# Patient Record
Sex: MALE | Race: OTHER | Employment: FULL TIME | URBAN - METROPOLITAN AREA
[De-identification: names, ages, dates, MRNs, and addresses within clinical notes are randomized per-mention and may not be internally consistent; named-entity substitution may affect disease eponyms.]

---

## 2021-09-12 ENCOUNTER — APPOINTMENT (OUTPATIENT)
Dept: GENERAL RADIOLOGY | Facility: HOSPITAL | Age: 26
End: 2021-09-12
Attending: EMERGENCY MEDICINE
Payer: COMMERCIAL

## 2021-09-12 ENCOUNTER — HOSPITAL ENCOUNTER (EMERGENCY)
Facility: HOSPITAL | Age: 26
Discharge: HOME | End: 2021-09-12
Attending: EMERGENCY MEDICINE
Payer: COMMERCIAL

## 2021-09-12 VITALS
HEIGHT: 68 IN | WEIGHT: 155 LBS | HEART RATE: 72 BPM | DIASTOLIC BLOOD PRESSURE: 83 MMHG | RESPIRATION RATE: 18 BRPM | SYSTOLIC BLOOD PRESSURE: 122 MMHG | OXYGEN SATURATION: 98 % | TEMPERATURE: 98 F | BODY MASS INDEX: 23.49 KG/M2

## 2021-09-12 DIAGNOSIS — S93.401A SPRAIN OF RIGHT ANKLE, UNSPECIFIED LIGAMENT, INITIAL ENCOUNTER: Primary | ICD-10-CM

## 2021-09-12 PROCEDURE — 99283 EMERGENCY DEPT VISIT LOW MDM: CPT

## 2021-09-12 PROCEDURE — 73610 X-RAY EXAM OF ANKLE: CPT | Performed by: EMERGENCY MEDICINE

## 2021-09-12 NOTE — ED QUICK NOTES
Patient given discharge instructions and work note. Patient verbalized understanding. Patient ambulated out of ED with steady gait on crutches.

## 2021-09-12 NOTE — ED PROVIDER NOTES
Patient Seen in: [facility] [place] AND [facility] Emergency Department    History   Patient presents with:  Leg or Foot Injury    Stated Complaint: R ankle injury    HPI    Patient presents to the emergency department with injury to the right ankle after twisting it pl Warm, dry, well perfused. Good skin turgor. No rashes seen. Neurology:  Moving all extremities equally with good coordination. No cranial nerve asymmetry noted. Psychiatric:  Normal affect. Oriented. No unusual behavior. Interacting well.     Patien

## 2021-09-12 NOTE — ED INITIAL ASSESSMENT (HPI)
The patient reports left ankle pain after twisting his ankle while playing basketball yesterday morning. There is notable swelling to the right ankle.

## 2021-12-11 ENCOUNTER — HOSPITAL ENCOUNTER (EMERGENCY)
Facility: HOSPITAL | Age: 26
Discharge: HOME | End: 2021-12-12
Payer: COMMERCIAL

## 2021-12-11 ENCOUNTER — APPOINTMENT (OUTPATIENT)
Dept: GENERAL RADIOLOGY | Facility: HOSPITAL | Age: 26
End: 2021-12-11
Attending: NURSE PRACTITIONER
Payer: COMMERCIAL

## 2021-12-11 DIAGNOSIS — U07.1 PNEUMONIA DUE TO COVID-19 VIRUS: Primary | ICD-10-CM

## 2021-12-11 DIAGNOSIS — J12.82 PNEUMONIA DUE TO COVID-19 VIRUS: Primary | ICD-10-CM

## 2021-12-11 PROCEDURE — 99284 EMERGENCY DEPT VISIT MOD MDM: CPT

## 2021-12-11 PROCEDURE — 84484 ASSAY OF TROPONIN QUANT: CPT | Performed by: NURSE PRACTITIONER

## 2021-12-11 PROCEDURE — 71045 X-RAY EXAM CHEST 1 VIEW: CPT | Performed by: NURSE PRACTITIONER

## 2021-12-11 PROCEDURE — 93005 ELECTROCARDIOGRAM TRACING: CPT

## 2021-12-11 PROCEDURE — 85025 COMPLETE CBC W/AUTO DIFF WBC: CPT | Performed by: NURSE PRACTITIONER

## 2021-12-11 PROCEDURE — 93010 ELECTROCARDIOGRAM REPORT: CPT | Performed by: NURSE PRACTITIONER

## 2021-12-11 PROCEDURE — 36415 COLL VENOUS BLD VENIPUNCTURE: CPT

## 2021-12-11 PROCEDURE — 80048 BASIC METABOLIC PNL TOTAL CA: CPT | Performed by: NURSE PRACTITIONER

## 2021-12-12 VITALS
WEIGHT: 155 LBS | RESPIRATION RATE: 18 BRPM | HEIGHT: 67 IN | BODY MASS INDEX: 24.33 KG/M2 | HEART RATE: 90 BPM | OXYGEN SATURATION: 94 % | SYSTOLIC BLOOD PRESSURE: 115 MMHG | DIASTOLIC BLOOD PRESSURE: 73 MMHG | TEMPERATURE: 98 F

## 2021-12-12 RX ORDER — ALBUTEROL SULFATE 90 UG/1
2 AEROSOL, METERED RESPIRATORY (INHALATION) EVERY 4 HOURS PRN
Qty: 1 EACH | Refills: 0 | Status: SHIPPED | OUTPATIENT
Start: 2021-12-12 | End: 2022-01-11

## 2021-12-12 NOTE — ED INITIAL ASSESSMENT (HPI)
Patient here with cough for the past week. Thursday patient began vomiting, having shortness of breath and body aches.

## 2021-12-12 NOTE — ED PROVIDER NOTES
Patient Seen in: [facility] AND [facility] Emergency Department      History   Patient presents with:  Cough/URI    Stated Complaint: cough, vomiting    Subjective:   25yo/m no chronic medical problems reports to the ED with cough, dyspnea for 1 week.  No vomitin Cardiovascular:      Rate and Rhythm: Normal rate and regular rhythm. Heart sounds: Normal heart sounds. Pulmonary:      Effort: Pulmonary effort is normal.      Breath sounds: Normal breath sounds.    Abdominal:      General: Bowel sounds are norm patchy opacities in the mid and lower lungs, suspicious for multifocal pneumonia/viral pneumonia/COVID-19 infection. Alternative consideration is diffuse aspiration. No pneumothorax. Normal cardiac silhouette. No free air under the diaphragm.

## (undated) NOTE — LETTER
Date & Time: 9/12/2021, 2:06 PM  Patient: Aryan Goss  Encounter Provider(s):    Karma Pardo MD       To Whom It May Concern:    Aryan Goss was seen and treated in our department on 9/12/2021.  He Will not be able to go to work tomorrow Sep